# Patient Record
Sex: FEMALE | Race: WHITE | Employment: UNEMPLOYED | URBAN - METROPOLITAN AREA
[De-identification: names, ages, dates, MRNs, and addresses within clinical notes are randomized per-mention and may not be internally consistent; named-entity substitution may affect disease eponyms.]

---

## 2020-05-06 ENCOUNTER — APPOINTMENT (OUTPATIENT)
Dept: GENERAL RADIOLOGY | Age: 40
End: 2020-05-06
Attending: EMERGENCY MEDICINE
Payer: MEDICAID

## 2020-05-06 ENCOUNTER — HOSPITAL ENCOUNTER (EMERGENCY)
Age: 40
Discharge: HOME | End: 2020-05-06
Attending: EMERGENCY MEDICINE
Payer: MEDICAID

## 2020-05-06 ENCOUNTER — APPOINTMENT (OUTPATIENT)
Dept: CT IMAGING | Age: 40
End: 2020-05-06
Attending: EMERGENCY MEDICINE
Payer: MEDICAID

## 2020-05-06 VITALS
OXYGEN SATURATION: 100 % | RESPIRATION RATE: 14 BRPM | TEMPERATURE: 98 F | HEART RATE: 73 BPM | DIASTOLIC BLOOD PRESSURE: 82 MMHG | SYSTOLIC BLOOD PRESSURE: 130 MMHG

## 2020-05-06 DIAGNOSIS — S39.012A BACK STRAIN, INITIAL ENCOUNTER: ICD-10-CM

## 2020-05-06 DIAGNOSIS — S09.90XA INJURY OF HEAD, INITIAL ENCOUNTER: ICD-10-CM

## 2020-05-06 DIAGNOSIS — S01.01XA LACERATION OF SCALP, INITIAL ENCOUNTER: ICD-10-CM

## 2020-05-06 DIAGNOSIS — R55 SYNCOPE AND COLLAPSE: Primary | ICD-10-CM

## 2020-05-06 LAB
ATRIAL RATE: 73 BPM
CALCULATED P AXIS, ECG09: 18 DEGREES
CALCULATED R AXIS, ECG10: 0 DEGREES
CALCULATED T AXIS, ECG11: 12 DEGREES
DIAGNOSIS, 93000: NORMAL
HCG UR QL: NEGATIVE
P-R INTERVAL, ECG05: 112 MS
Q-T INTERVAL, ECG07: 418 MS
QRS DURATION, ECG06: 90 MS
QTC CALCULATION (BEZET), ECG08: 460 MS
VENTRICULAR RATE, ECG03: 73 BPM

## 2020-05-06 PROCEDURE — 75810000293 HC SIMP/SUPERF WND  RPR

## 2020-05-06 PROCEDURE — 99283 EMERGENCY DEPT VISIT LOW MDM: CPT

## 2020-05-06 PROCEDURE — 74011000250 HC RX REV CODE- 250: Performed by: EMERGENCY MEDICINE

## 2020-05-06 PROCEDURE — 74011250637 HC RX REV CODE- 250/637: Performed by: EMERGENCY MEDICINE

## 2020-05-06 PROCEDURE — 72072 X-RAY EXAM THORAC SPINE 3VWS: CPT

## 2020-05-06 PROCEDURE — 70450 CT HEAD/BRAIN W/O DYE: CPT

## 2020-05-06 PROCEDURE — 74011250636 HC RX REV CODE- 250/636: Performed by: EMERGENCY MEDICINE

## 2020-05-06 PROCEDURE — 93005 ELECTROCARDIOGRAM TRACING: CPT

## 2020-05-06 PROCEDURE — 81025 URINE PREGNANCY TEST: CPT

## 2020-05-06 PROCEDURE — 96372 THER/PROPH/DIAG INJ SC/IM: CPT

## 2020-05-06 RX ORDER — ACETAMINOPHEN 500 MG
1000 TABLET ORAL
Status: COMPLETED | OUTPATIENT
Start: 2020-05-06 | End: 2020-05-06

## 2020-05-06 RX ORDER — LIDOCAINE HYDROCHLORIDE AND EPINEPHRINE 10; 10 MG/ML; UG/ML
4.5 INJECTION, SOLUTION INFILTRATION; PERINEURAL ONCE
Status: COMPLETED | OUTPATIENT
Start: 2020-05-06 | End: 2020-05-06

## 2020-05-06 RX ORDER — KETOROLAC TROMETHAMINE 30 MG/ML
60 INJECTION, SOLUTION INTRAMUSCULAR; INTRAVENOUS ONCE
Status: COMPLETED | OUTPATIENT
Start: 2020-05-06 | End: 2020-05-06

## 2020-05-06 RX ORDER — CYCLOBENZAPRINE HCL 10 MG
10 TABLET ORAL
Qty: 10 TAB | Refills: 0 | Status: SHIPPED | OUTPATIENT
Start: 2020-05-06 | End: 2020-08-18

## 2020-05-06 RX ADMIN — LIDOCAINE HYDROCHLORIDE,EPINEPHRINE BITARTRATE 45 MG: 10; .01 INJECTION, SOLUTION INFILTRATION; PERINEURAL at 01:09

## 2020-05-06 RX ADMIN — KETOROLAC TROMETHAMINE 60 MG: 30 INJECTION, SOLUTION INTRAMUSCULAR at 02:01

## 2020-05-06 RX ADMIN — ACETAMINOPHEN 1000 MG: 500 TABLET ORAL at 01:09

## 2020-05-06 NOTE — DISCHARGE INSTRUCTIONS
Patient Education        Back Strain: Care Instructions  Overview    A back strain happens when you overstretch, or pull, a muscle in your back. You may hurt your back in an accident or when you exercise or lift something. Sometimes you may not know how you hurt your back. Most back pain will get better with rest and time. You can take care of yourself at home to help your back heal.  Follow-up care is a key part of your treatment and safety. Be sure to make and go to all appointments, and call your doctor if you are having problems. It's also a good idea to know your test results and keep a list of the medicines you take. How can you care for yourself at home? · Try to stay as active as you can, but stop or reduce any activity that causes pain. · Put ice or a cold pack on the sore muscle for 10 to 20 minutes at a time to stop swelling. Try this every 1 to 2 hours for 3 days (when you are awake) or until the swelling goes down. Put a thin cloth between the ice pack and your skin. · After 2 or 3 days, apply a heating pad on low or a warm cloth to your back. Some doctors suggest that you go back and forth between hot and cold treatments. · Take pain medicines exactly as directed. ? If the doctor gave you a prescription medicine for pain, take it as prescribed. ? If you are not taking a prescription pain medicine, ask your doctor if you can take an over-the-counter medicine. · Try sleeping on your side with a pillow between your legs. Or put a pillow under your knees when you lie on your back. These measures can ease pain in your lower back. · Return to your usual level of activity slowly. When should you call for help? Call 911 anytime you think you may need emergency care. For example, call if:    · You are unable to move a leg at all.   [facility] your doctor now or seek immediate medical care if:    · You have new or worse symptoms in your legs, belly, or buttocks.  Symptoms may include:  ? Numbness or tingling. ? Weakness. ? Pain.     · You lose bladder or bowel control.    Watch closely for changes in your health, and be sure to contact your doctor if:    · You have a fever, lose weight, or don't feel well.     · You are not getting better as expected. Where can you learn more? Go to http://tej-france.info/  Enter Z587 in the search box to learn more about \"Back Strain: Care Instructions. \"  Current as of: June 26, 2019Content Version: 12.4  © 8053-6355 [employer]. Care instructions adapted under license by [employer] (which disclaims liability or warranty for this information). If you have questions about a medical condition or this instruction, always ask your healthcare professional. [address] any warranty or liability for your use of this information. Patient Education        Learning About a Closed Head Injury  What is a closed head injury? A closed head injury happens when your head gets hit hard. The strong force of the blow causes your brain to shake in your skull. This movement can cause the brain to bruise, swell, or tear. Sometimes nerves or blood vessels also get damaged. This can cause bleeding in or around the brain. A concussion is a type of closed head injury. What are the symptoms? If you have a mild concussion, you may have a mild headache or feel \"not quite right. \" These symptoms are common. They usually go away over a few days to 4 weeks. But sometimes after a concussion, you feel like you can't function as well as before the injury. And you have new symptoms. This is called postconcussive syndrome. You may:  · Find it harder to solve problems, think, concentrate, or remember. · Have headaches. · Have changes in your sleep patterns, such as not being able to sleep or sleeping all the time. · Have changes in your personality. · Not be interested in your usual activities.   · Feel angry or anxious without a clear reason. · Lose your sense of taste or smell. · Be dizzy, lightheaded, or unsteady. It may be hard to stand or walk. How is a closed head injury treated? Any person who may have a concussion needs to see a doctor. Some people have to stay in the hospital to be watched. Others can go home safely. If you go home, follow your doctor's instructions. He or she will tell you if you need someone to watch you closely for the next 24 hours or longer. Rest is the best treatment. Get plenty of sleep at night. And try to rest during the day. · Avoid activities that are physically or mentally demanding. These include housework, exercise, and schoolwork. And don't play video games, send text messages, or use the computer. You may need to change your school or work schedule to be able to avoid these activities. · Ask your doctor when it's okay to drive, ride a bike, or operate machinery. · Take an over-the-counter pain medicine, such as acetaminophen (Tylenol), ibuprofen (Advil, Motrin), or naproxen (Aleve). Be safe with medicines. Read and follow all instructions on the label. · Check with your doctor before you use any other medicines for pain. · Do not drink alcohol or use illegal drugs. They can slow recovery. They can also increase your risk of getting a second head injury. Follow-up care is a key part of your treatment and safety. Be sure to make and go to all appointments, and call your doctor if you are having problems. It's also a good idea to know your test results and keep a list of the medicines you take. Where can you learn more? Go to http://tej-france.info/  Enter E235 in the search box to learn more about \"Learning About a Closed Head Injury. \"  Current as of: November 19, 2019Content Version: 12.4  © 8705-5272 [employer]. Care instructions adapted under license by [employer] (which disclaims liability or warranty for this information).  If you have questions about a medical condition or this instruction, always ask your healthcare professional. [address] any warranty or liability for your use of this information. Patient Education        Fainting: Care Instructions  Your Care Instructions    When you faint, or pass out, you lose consciousness for a short time. A brief drop in blood flow to the brain often causes it. When you fall or lie down, more blood flows to your brain and you regain consciousness. Emotional stress, pain, or overheating--especially if you have been standing--can make you faint. In these cases, fainting is usually not serious. But fainting can be a sign of a more serious problem. Your doctor may want you to have more tests to rule out other causes. The treatment you need depends on the reason why you fainted. The doctor has checked you carefully, but problems can develop later. If you notice any problems or new symptoms, get medical treatment right away. Follow-up care is a key part of your treatment and safety. Be sure to make and go to all appointments, and call your doctor if you are having problems. It's also a good idea to know your test results and keep a list of the medicines you take. How can you care for yourself at home? · Drink plenty of fluids to prevent dehydration. If you have kidney, heart, or liver disease and have to limit fluids, talk with your doctor before you increase your fluid intake. When should you call for help? Call 911 anytime you think you may need emergency care. For example, call if:    · You have symptoms of a heart problem. These may include:  ? Chest pain or pressure. ? Severe trouble breathing. ? A fast or irregular heartbeat. ? Lightheadedness or sudden weakness. ? Coughing up pink, foamy mucus. ? Passing out. After you call  911, the  may tell you to chew 1 adult-strength or 2 to 4 low-dose aspirin. Wait for an ambulance.  Do not try to drive yourself.     · You have symptoms of a stroke. These may include:  ? Sudden numbness, tingling, weakness, or loss of movement in your face, arm, or leg, especially on only one side of your body. ? Sudden vision changes. ? Sudden trouble speaking. ? Sudden confusion or trouble understanding simple statements. ? Sudden problems with walking or balance. ? A sudden, severe headache that is different from past headaches.     · You passed out (lost consciousness) again.    Watch closely for changes in your health, and be sure to contact your doctor if:    · You do not get better as expected. Where can you learn more? Go to http://tej-france.info/  Enter A848 in the search box to learn more about \"Fainting: Care Instructions. \"  Current as of: June 26, 2019Content Version: 12.4  © 1144-8483 [employer]. Care instructions adapted under license by [employer] (which disclaims liability or warranty for this information). If you have questions about a medical condition or this instruction, always ask your healthcare professional. [address] any warranty or liability for your use of this information. Patient Education        Cuts: Care Instructions  Your Care Instructions  A cut can happen anywhere on your body. Stitches, staples, skin adhesives, or pieces of tape called Steri-Strips are sometimes used to keep the edges of a cut together and help it heal. Steri-Strips can be used by themselves or with stitches or staples. Sometimes cuts are left open. If the cut went deep and through the skin, the doctor may have closed the cut in two layers. A deeper layer of stitches brings the deep part of the cut together. These stitches will dissolve and don't need to be removed. The upper layer closure, which could be stitches, staples, Steri-Strips, or adhesive, is what you see on the cut. A cut is often covered by a bandage.   The doctor has checked you carefully, but problems can develop later. If you notice any problems or new symptoms, get medical treatment right away. Follow-up care is a key part of your treatment and safety. Be sure to make and go to all appointments, and call your doctor if you are having problems. It's also a good idea to know your test results and keep a list of the medicines you take. How can you care for yourself at home? If a cut is open or closed  · Prop up the sore area on a pillow anytime you sit or lie down during the next 3 days. Try to keep it above the level of your heart. This will help reduce swelling. · Keep the cut dry for the first 24 to 48 hours. After this, you can shower if your doctor okays it. Pat the cut dry. · Don't soak the cut, such as in a bathtub. Your doctor will tell you when it's safe to get the cut wet. · After the first 24 to 48 hours, clean the cut with soap and water 2 times a day unless your doctor gives you different instructions. ? Don't use hydrogen peroxide or alcohol, which can slow healing. ? You may cover the cut with a thin layer of petroleum jelly and a nonstick bandage. ? If the doctor put a bandage over the cut, put on a new bandage after cleaning the cut or if the bandage gets wet or dirty. · Avoid any activity that could cause your cut to reopen. · Be safe with medicines. Read and follow all instructions on the label. ? If the doctor gave you a prescription medicine for pain, take it as prescribed. ? If you are not taking a prescription pain medicine, ask your doctor if you can take an over-the-counter medicine. If the cut is closed with stitches, staples, or Steri-Strips  · Follow the above instructions for open or closed cuts. · Do not remove the stitches or staples on your own. Your doctor will tell you when to come back to have the stitches or staples removed. · Leave Steri-Strips on until they fall off.   If the cut is closed with a skin adhesive  · Follow the above instructions for open or closed cuts. · Leave the skin adhesive on your skin until it falls off on its own. This may take 5 to 10 days. · Do not scratch, rub, or pick at the adhesive. · Do not put the sticky part of a bandage directly on the adhesive. · Do not put any kind of ointment, cream, or lotion over the area. This can make the adhesive fall off too soon. Do not use hydrogen peroxide or alcohol, which can slow healing. When should you call for help? Call your doctor now or seek immediate medical care if:    · You have new pain, or your pain gets worse.     · The skin near the cut is cold or pale or changes color.     · You have tingling, weakness, or numbness near the cut.     · The cut starts to bleed, and blood soaks through the bandage. Oozing small amounts of blood is normal.     · You have trouble moving the area near the cut.     · You have symptoms of infection, such as:  ? Increased pain, swelling, warmth, or redness around the cut.  ? Red streaks leading from the cut.  ? Pus draining from the cut.  ? A fever.    Watch closely for changes in your health, and be sure to contact your doctor if:    · The cut reopens.     · You do not get better as expected. Where can you learn more? Go to http://tej-france.info/  Enter M735 in the search box to learn more about \"Cuts: Care Instructions. \"  Current as of: June 26, 2019Content Version: 12.4  © 6686-0707 Healthwise, Incorporated. Care instructions adapted under license by [employer] (which disclaims liability or warranty for this information). If you have questions about a medical condition or this instruction, always ask your healthcare professional. [address] any warranty or liability for your use of this information.

## 2020-05-06 NOTE — ED PROVIDER NOTES
History of autoimmune disease, cancer. She presents after an apparent syncopal event. She states that she got up while watching a movie and felt dizzy. While she was going to get some water, she apparently fainted. She struck the back of her head and has a laceration. She complains of midthoracic pain in between her shoulder blades since the fall. It is moderate and worse with movement. She has a mild headache. She denies nausea, vomiting, or ongoing dizziness. She states that she did not eat as much throughout the day today. No fever, cough, congestion, chest pain, shortness of breath. She states that she thinks she was unconscious for about a minute before regaining it. Past Medical History:   Diagnosis Date    [facility]     Cancer ([facility]        History reviewed. No pertinent surgical history. History reviewed. No pertinent family history. Social History     Socioeconomic History    Marital status:      Spouse name: Not on file    Number of children: Not on file    Years of education: Not on file    Highest education level: Not on file   Occupational History    Not on file   Social Needs    Financial resource strain: Not on file    Food insecurity     Worry: Not on file     Inability: Not on file    Transportation needs     Medical: Not on file     Non-medical: Not on file   Tobacco Use    Smoking status: Never Smoker    Smokeless tobacco: Never Used   Substance and Sexual Activity    Alcohol use:  Yes     Alcohol/week: 0.8 standard drinks     Types: 1 Glasses of wine per week    Drug use: No    Sexual activity: Yes     Partners: Male     Birth control/protection: Condom, Pill   Lifestyle    Physical activity     Days per week: Not on file     Minutes per session: Not on file    Stress: Not on file   Relationships    Social connections     Talks on phone: Not on file     Gets together: Not on file     Attends Rastafari service: Not on file Active member of club or organization: Not on file     Attends meetings of clubs or organizations: Not on file     Relationship status: Not on file    Intimate partner violence     Fear of current or ex partner: Not on file     Emotionally abused: Not on file     Physically abused: Not on file     Forced sexual activity: Not on file   Other Topics Concern    Not on file   Social History Narrative    Not on file         ALLERGIES: Ceclor [cefaclor] and Tetanus vaccines and toxoid    Review of Systems   All other systems reviewed and are negative. There were no vitals filed for this visit. Physical Exam  Vitals signs and nursing note reviewed. Constitutional:       Appearance: She is well-developed. HENT:      Head: Normocephalic. Comments: 3 cm posterior scalp laceration. Bleeding controlled. Eyes:      Conjunctiva/sclera: Conjunctivae normal.   Neck:      Musculoskeletal: Neck supple. Trachea: No tracheal deviation. Cardiovascular:      Rate and Rhythm: Normal rate and regular rhythm. Heart sounds: Normal heart sounds. No murmur. No friction rub. No gallop. Pulmonary:      Effort: Pulmonary effort is normal.      Breath sounds: Normal breath sounds. Abdominal:      Palpations: Abdomen is soft. Tenderness: There is no abdominal tenderness. Musculoskeletal:         General: No deformity. Comments: Midthoracic tenderness in between her shoulder blades. Skin:     General: Skin is warm and dry. Neurological:      Mental Status: She is alert. Comments: oriented          MDM       Wound Repair  Date/Time: 5/6/2020 2:55 AM  Performed by: attendingPre-procedure re-eval: Immediately prior to the procedure, the patient was reevaluated and found suitable for the planned procedure and any planned medications.   Location details: scalp  Wound length:2.6 - 7.5 cm  Anesthesia: local infiltration    Anesthesia:  Local Anesthetic: lidocaine 1% with epinephrine  Anesthetic total: 5 mL  Foreign bodies: no foreign bodies  Skin closure: staples  Number of sutures: 5 staples. Approximation: close  Patient tolerance: Patient tolerated the procedure well with no immediate complications  My total time at bedside, performing this procedure was 1-15 minutes. Patient declines blood work. She is okay with head CT, T-spine films, and EKG. Beth Harada, MD  1:08 AM    EKG: Normal sinus rhythm; rate of 73; normal ST, T.  Normal intervals. Beth Harada, MD  1:12 AM      Progress Note:  Results, treatment, and follow up plan have been discussed with patient. Questions were answered. Beth Harada, MD  2:22 AM    Assessment/plan: Presents after syncopal event -suspect vasovagal syncope. She suffered a posterior scalp laceration during the fall. She also complains of midthoracic back pain. Head CT and thoracic spine films negative. Reassuring appearance/exam with stable vital signs. She declines blood work. EKG is normal.  Follow-up with PCP/Ortho as needed. Tylenol/ibuprofen, Flexeril for pain.   Beth Harada, MD  2:23 AM

## 2020-05-06 NOTE — ED TRIAGE NOTES
Triage note:  Pt arrived with c/o passing out and a GLF and hit posterior head. Pt also c/o pain to back s/p fall.

## 2020-05-07 ENCOUNTER — PATIENT OUTREACH (OUTPATIENT)
Dept: FAMILY MEDICINE CLINIC | Age: 40
End: 2020-05-07

## 2020-05-15 ENCOUNTER — HOSPITAL ENCOUNTER (EMERGENCY)
Age: 40
Discharge: HOME | End: 2020-05-15
Attending: EMERGENCY MEDICINE
Payer: MEDICAID

## 2020-05-15 VITALS
OXYGEN SATURATION: 99 % | BODY MASS INDEX: 20.55 KG/M2 | WEIGHT: 135.58 LBS | HEART RATE: 56 BPM | TEMPERATURE: 97.6 F | SYSTOLIC BLOOD PRESSURE: 119 MMHG | RESPIRATION RATE: 16 BRPM | DIASTOLIC BLOOD PRESSURE: 67 MMHG | HEIGHT: 68 IN

## 2020-05-15 DIAGNOSIS — Z48.02 ENCOUNTER FOR STAPLE REMOVAL: Primary | ICD-10-CM

## 2020-05-15 PROCEDURE — 75810000275 HC EMERGENCY DEPT VISIT NO LEVEL OF CARE

## 2020-05-15 NOTE — ED PROVIDER NOTES
68-year-old female presents for staple remover. They have been in 10 days. She fell 10 days ago after becoming dizzy. She had a normal work-up at that time but declined blood work. She denies any recurrent episodes of syncope or dizziness. She denies any fevers or drainage from the site. There are no other medical concerns at this time. The history is provided by the patient. Staple Removal    This is a new problem. Episode onset: 10 days. The problem occurs constantly. The pain is at a severity of 0/10. The patient is experiencing no pain. There has been a history of trauma. Past Medical History:   Diagnosis Date    [facility] ([facility]     [facility]        History reviewed. No pertinent surgical history. History reviewed. No pertinent family history. Social History     Socioeconomic History    Marital status: LEGALLY      Spouse name: Not on file    Number of children: Not on file    Years of education: Not on file    Highest education level: Not on file   Occupational History    Not on file   Social Needs    Financial resource strain: Not on file    Food insecurity     Worry: Not on file     Inability: Not on file    Transportation needs     Medical: Not on file     Non-medical: Not on file   Tobacco Use    Smoking status: Never Smoker    Smokeless tobacco: Never Used   Substance and Sexual Activity    Alcohol use:  Yes     Alcohol/week: 0.8 standard drinks     Types: 1 Glasses of wine per week    Drug use: No    Sexual activity: Yes     Partners: Male     Birth control/protection: Condom, Pill   Lifestyle    Physical activity     Days per week: Not on file     Minutes per session: Not on file    Stress: Not on file   Relationships    Social connections     Talks on phone: Not on file     Gets together: Not on file     Attends Restoration service: Not on file     Active member of club or organization: Not on file     Attends meetings of clubs or organizations: Not on file     Relationship status: Not on file    Intimate partner violence     Fear of current or ex partner: Not on file     Emotionally abused: Not on file     Physically abused: Not on file     Forced sexual activity: Not on file   Other Topics Concern    Not on file   Social History Narrative    Not on file         ALLERGIES: Ceclor [cefaclor] and Tetanus vaccines and toxoid    Review of Systems   All other systems reviewed and are negative. Vitals:    05/15/20 0829   BP: 119/67   Pulse: (!) 56   Resp: 16   Temp: 97.6 °F (36.4 °C)   SpO2: 99%   Weight: 61.5 kg (135 lb 9.3 oz)   Height: 5' 8\" (1.727 m)            Physical Exam  Constitutional:       Appearance: She is well-developed. HENT:      Head: Normocephalic and atraumatic. Eyes:      General: No scleral icterus. Neck:      Musculoskeletal: No neck rigidity. Trachea: No tracheal deviation. Cardiovascular:      Rate and Rhythm: Normal rate. Pulmonary:      Effort: Pulmonary effort is normal. No respiratory distress. Abdominal:      General: There is no distension. Genitourinary:     Comments: deferred  Musculoskeletal:         General: No deformity. Skin:     General: Skin is dry. Comments: 5 staples in posterior scalp with no erythema or active purulent drainage. Neurological:      General: No focal deficit present. Mental Status: She is alert. Psychiatric:         Mood and Affect: Mood normal.          MDM  Number of Diagnoses or Management Options  Diagnosis management comments: Staples removed by nursing staff. Patient stable for discharge.          Procedures

## 2020-05-15 NOTE — ED TRIAGE NOTES
Pt returns to ED for staple removal from well healed scalp laceration. Staples have been in for ten days.

## 2020-08-18 ENCOUNTER — HOSPITAL ENCOUNTER (EMERGENCY)
Age: 40
Discharge: HOME | End: 2020-08-18
Attending: EMERGENCY MEDICINE
Payer: MEDICAID

## 2020-08-18 VITALS
WEIGHT: 130.51 LBS | BODY MASS INDEX: 20.48 KG/M2 | RESPIRATION RATE: 16 BRPM | SYSTOLIC BLOOD PRESSURE: 137 MMHG | TEMPERATURE: 98.4 F | HEIGHT: 67 IN | HEART RATE: 58 BPM | OXYGEN SATURATION: 100 % | DIASTOLIC BLOOD PRESSURE: 94 MMHG

## 2020-08-18 DIAGNOSIS — S61.216A LACERATION OF RIGHT LITTLE FINGER WITHOUT FOREIGN BODY WITHOUT DAMAGE TO NAIL, INITIAL ENCOUNTER: Primary | ICD-10-CM

## 2020-08-18 PROCEDURE — 75810000283 HC INJECTION NERVE BLOCK

## 2020-08-18 PROCEDURE — 74011000250 HC RX REV CODE- 250: Performed by: EMERGENCY MEDICINE

## 2020-08-18 PROCEDURE — 99282 EMERGENCY DEPT VISIT SF MDM: CPT

## 2020-08-18 RX ORDER — LIDOCAINE HYDROCHLORIDE 10 MG/ML
10 INJECTION, SOLUTION EPIDURAL; INFILTRATION; INTRACAUDAL; PERINEURAL
Status: COMPLETED | OUTPATIENT
Start: 2020-08-18 | End: 2020-08-18

## 2020-08-18 RX ORDER — CEPHALEXIN 500 MG/1
500 CAPSULE ORAL 2 TIMES DAILY
Qty: 14 CAP | Refills: 0 | Status: SHIPPED | OUTPATIENT
Start: 2020-08-18 | End: 2020-08-25

## 2020-08-18 RX ADMIN — LIDOCAINE HYDROCHLORIDE 10 ML: 10 INJECTION, SOLUTION EPIDURAL; INFILTRATION; INTRACAUDAL; PERINEURAL at 09:34

## 2020-08-18 NOTE — ED NOTES
Pt was discharged and given instructions by Dr Konstantin Jones . Pt verbalized good understanding of all discharge instructions,e scribed prescription and F/U care. All questions answered. Pt in stable condition on discharge.
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
## 2021-02-17 ENCOUNTER — VIRTUAL VISIT (OUTPATIENT)
Dept: FAMILY MEDICINE CLINIC | Age: 41
End: 2021-02-17
Payer: MEDICAID

## 2021-02-17 DIAGNOSIS — G47.00 INSOMNIA, UNSPECIFIED TYPE: ICD-10-CM

## 2021-02-17 DIAGNOSIS — G47.00 INSOMNIA, UNSPECIFIED TYPE: Primary | ICD-10-CM

## 2021-02-17 DIAGNOSIS — R63.5 UNINTENDED WEIGHT GAIN: ICD-10-CM

## 2021-02-17 DIAGNOSIS — Z78.9 STRICT VEGETARIAN DIET: ICD-10-CM

## 2021-02-17 DIAGNOSIS — G47.8 NON-RESTORATIVE SLEEP: ICD-10-CM

## 2021-02-17 DIAGNOSIS — Z76.89 ESTABLISHING CARE WITH NEW DOCTOR, ENCOUNTER FOR: ICD-10-CM

## 2021-02-17 DIAGNOSIS — R53.83 FATIGUE, UNSPECIFIED TYPE: ICD-10-CM

## 2021-02-17 PROCEDURE — 99204 OFFICE O/P NEW MOD 45 MIN: CPT | Performed by: FAMILY MEDICINE

## 2021-02-17 RX ORDER — TRAZODONE HYDROCHLORIDE 50 MG/1
50 TABLET ORAL
Qty: 30 TAB | Refills: 1 | Status: SHIPPED | OUTPATIENT
Start: 2021-02-17 | End: 2021-03-16 | Stop reason: SDUPTHER

## 2021-02-17 RX ORDER — DROSPIRENONE AND ETHINYL ESTRADIOL 0.02-3(28)
KIT ORAL
COMMUNITY
End: 2022-01-20

## 2021-02-17 NOTE — PROGRESS NOTES
John Jimenez is a 47 y.o. female who was seen by synchronous (real-time) audio-video technology on 2/17/2021. Consent: John Jimenez, who was seen by synchronous (real-time) audio-video technology, and/or her healthcare decision maker, is aware that this patient-initiated, Telehealth encounter on 2/17/2021 is a billable service, with coverage as determined by her insurance carrier. She is aware that she may receive a bill and has provided verbal consent to proceed: Yes. Assessment & Plan:   1. Insomnia, unspecified type  Will look for metabolic/endocrine underlying cause  Sleep hygiene is recommended  Pt has trialed home remedies including benadryl, hygiene changes and melatonin  Recommend trial trazodone, avoid addictive medications as first line and pt understanding agreeable  - CBC W/O DIFF; Future  - METABOLIC PANEL, COMPREHENSIVE; Future  - LIPID PANEL; Future  - TSH 3RD GENERATION; Future  - VITAMIN B12 & FOLATE; Future  - FERRITIN; Future  - traZODone (DESYREL) 50 mg tablet; Take 1 Tab by mouth nightly. Dispense: 30 Tab; Refill: 1    2. Fatigue, unspecified type  Labs per orders, best fasting if possible  - CBC W/O DIFF; Future  - METABOLIC PANEL, COMPREHENSIVE; Future  - LIPID PANEL; Future  - TSH 3RD GENERATION; Future  - VITAMIN B12 & FOLATE; Future    3. Unintended weight gain  As per orders check labs, c/w healthy habits at home  - CBC W/O DIFF; Future  - METABOLIC PANEL, COMPREHENSIVE; Future  - LIPID PANEL; Future  - TSH 3RD GENERATION; Future  - VITAMIN B12 & FOLATE; Future    4. Strict vegetarian diet  Labs per orders, c/w healthy diet  - CBC W/O DIFF; Future  - METABOLIC PANEL, COMPREHENSIVE; Future  - LIPID PANEL; Future  - TSH 3RD GENERATION; Future  - VITAMIN B12 & FOLATE; Future  - FERRITIN; Future    5. Establishing care with new doctor, encounter for  Discussed HM with patient    6. Non-restorative sleep  As above  - CBC W/O DIFF; Future  - METABOLIC PANEL, COMPREHENSIVE;  Future  - LIPID PANEL; Future  - TSH 3RD GENERATION; Future  - VITAMIN B12 & FOLATE; Future  - FERRITIN; Future  - traZODone (DESYREL) 50 mg tablet; Take 1 Tab by mouth nightly. Dispense: 30 Tab; Refill: 1          Pt was counseled on risks, benefits and alternatives of treatment options. All questions were asked and answered and the patient was agreeable with the treatment plan as outlined. Encounter time today was 40 minutes and more than 50% of this encounter was spent in counseling face-to-face regarding Diagnosis, Patient Education, Medication Management, Compliance and Impressions. Time documented includes face to face time, documentation and chart review if applicable except as noted.   Subjective:   Jena Jain is a 47 y.o. female who was seen for [facility] [address] 85: house with 3 children  Work: real estate  Last PCP: a while ago, hasn't bee consistent with PCP, doesn't remember the last time she was seen with gynecology--has an upcoming appointment, [facility]  Dentist: goes regularly  Eye Doctor: not regularly, had Lasik 5 years ago, has a little bit of dry eyes from this    Exercise: yes running, 5-6 times a week 3-4 miles  Diet: eating healthy, vegetarian    Sluggish, fatigued, having trouble sleeping--she falls asleep fine but she can't seem to sleep past 330 or 4 in the morning  She is taking benadryl, she wakes up at 430 or 5 in the morning with this    Weight change: she is exercising and eating well but she is gaining weight (5-8 lbs unintended) and thyroid runs in her family    Gums are receding  Grinding teeth    Not feeling particularly anxious or depression  3 most recent PHQ Screens 2/17/2021   Little interest or pleasure in doing things Not at all   Feeling down, depressed, irritable, or hopeless Not at all   Total Score PHQ 2 0     Today no n/v/d/c/f/ch/cp or sob    At night she reports she is quite gassy, she has a lot of flatulence worsening in the last 5-6 years  Stool is soft and regular  Not taking any supplements  She eats oatmeal in the morning  No diarrhea no rectal bleeding, stools are on the loose end, she eats a lot of fruits and veggies    Etoh: a few drinks a week  Tob: no  Illicit: no    Sa: one male partner    fhx brca in great grandmother  Grandmother had pancreatic cancer    Medications, allergies, PMH, PSH, SOCH, [address] reviewed and updated per routine protocol, see chart for review and changes if not noted here. ROS  A 12 point review of systems was negative except as noted here or in the HPI.     Objective:   Vital Signs: (As obtained by patient/caregiver at home)  Patient-Reported Vitals 2/17/2021   Patient-Reported Weight 138lbs   Patient-Reported Pulse 58   Patient-Reported Temperature 98.4   Patient-Reported Systolic  704   Patient-Reported Diastolic 81        [INSTRUCTIONS:  \"[x]\" Indicates a positive item  \"[]\" Indicates a negative item  -- DELETE ALL ITEMS NOT EXAMINED]    Constitutional: [x] Appears well-developed and well-nourished [x] No apparent distress      [] Abnormal -     Mental status: [x] Alert and awake  [x] Oriented to person/place/time [x] Able to follow commands    [] Abnormal -     Eyes:   EOM    [x]  Normal    [] Abnormal -   Sclera  [x]  Normal    [] Abnormal -          Discharge [x]  None visible   [] Abnormal -     HENT: [x] Normocephalic, atraumatic  [] Abnormal -   [x] Mouth/Throat: Mucous membranes are moist    External Ears [x] Normal  [] Abnormal -    Neck: [x] No visualized mass [] Abnormal -     Pulmonary/Chest: [x] Respiratory effort normal   [x] No visualized signs of difficulty breathing or respiratory distress        [] Abnormal -      Musculoskeletal:   [] Normal gait with no signs of ataxia         [x] Normal range of motion of neck        [] Abnormal -     Neurological:        [x] No Facial Asymmetry (Cranial nerve 7 motor function) (limited exam due to video visit)          [x] No gaze palsy        [] Abnormal -          Skin:        [x] No significant exanthematous lesions or discoloration noted on facial skin         [] Abnormal -            Psychiatric:       [x] Normal Affect [] Abnormal -        [x] No Hallucinations    Other pertinent observable physical exam findings:seated, well appearing, no distress, non toxic    We discussed the expected course, resolution and complications of the diagnosis(es) in detail. Medication risks, benefits, costs, interactions, and alternatives were discussed as indicated. I advised her to contact the office if her condition worsens, changes or fails to improve as anticipated. She expressed understanding with the diagnosis(es) and plan. Berenice Gorman is a 47 y.o. female who was evaluated by a video visit encounter for concerns as above. Patient identification was verified prior to start of the visit. A caregiver was present when appropriate. Due to this being a TeleHealth encounter ([facility]), evaluation of the following organ systems was limited: Vitals/Constitutional/EENT/Resp/CV/GI//MS/Neuro/Skin/Heme-Lymph-Imm. Pursuant to the emergency declaration under the [facility], [facility] waiver authority and the [employer] Act, this Virtual  Visit was conducted, with patient's (and/or legal guardian's) consent, to reduce the patient's risk of exposure to COVID-19 and provide necessary medical care. Services were provided through a video synchronous discussion virtually to substitute for in-person clinic visit. Patient and provider were located at their individual homes. Sergio Melvin MD  [facility]  02/17/21 9:39 AM     Portions of this note may have been populated using smart dictation software and may have \"sounds-like\" errors present.

## 2021-02-18 LAB
ALBUMIN SERPL-MCNC: 4 G/DL (ref 3.8–4.8)
ALBUMIN/GLOB SERPL: 1.5 {RATIO} (ref 1.2–2.2)
ALP SERPL-CCNC: 73 IU/L (ref 39–117)
ALT SERPL-CCNC: 13 IU/L (ref 0–32)
AST SERPL-CCNC: 20 IU/L (ref 0–40)
BILIRUB SERPL-MCNC: 0.6 MG/DL (ref 0–1.2)
BUN SERPL-MCNC: 7 MG/DL (ref 6–20)
BUN/CREAT SERPL: 10 (ref 9–23)
CALCIUM SERPL-MCNC: 9.4 MG/DL (ref 8.7–10.2)
CHLORIDE SERPL-SCNC: 101 MMOL/L (ref 96–106)
CHOLEST SERPL-MCNC: 226 MG/DL (ref 100–199)
CO2 SERPL-SCNC: 26 MMOL/L (ref 20–29)
CREAT SERPL-MCNC: 0.73 MG/DL (ref 0.57–1)
ERYTHROCYTE [DISTWIDTH] IN BLOOD BY AUTOMATED COUNT: 12.2 % (ref 11.7–15.4)
FERRITIN SERPL-MCNC: 11 NG/ML (ref 15–150)
FOLATE SERPL-MCNC: 13.7 NG/ML
GLOBULIN SER CALC-MCNC: 2.7 G/DL (ref 1.5–4.5)
GLUCOSE SERPL-MCNC: 82 MG/DL (ref 65–99)
HCT VFR BLD AUTO: 34.9 % (ref 34–46.6)
HDLC SERPL-MCNC: 109 MG/DL
HGB BLD-MCNC: 11.8 G/DL (ref 11.1–15.9)
INTERPRETATION, 910389: NORMAL
LDLC SERPL CALC-MCNC: 107 MG/DL (ref 0–99)
MCH RBC QN AUTO: 31.2 PG (ref 26.6–33)
MCHC RBC AUTO-ENTMCNC: 33.8 G/DL (ref 31.5–35.7)
MCV RBC AUTO: 92 FL (ref 79–97)
PLATELET # BLD AUTO: 323 X10E3/UL (ref 150–450)
POTASSIUM SERPL-SCNC: 4.4 MMOL/L (ref 3.5–5.2)
PROT SERPL-MCNC: 6.7 G/DL (ref 6–8.5)
RBC # BLD AUTO: 3.78 X10E6/UL (ref 3.77–5.28)
SODIUM SERPL-SCNC: 138 MMOL/L (ref 134–144)
TRIGL SERPL-MCNC: 57 MG/DL (ref 0–149)
TSH SERPL DL<=0.005 MIU/L-ACNC: 0.87 UIU/ML (ref 0.45–4.5)
VIT B12 SERPL-MCNC: 259 PG/ML (ref 232–1245)
VLDLC SERPL CALC-MCNC: 10 MG/DL (ref 5–40)
WBC # BLD AUTO: 6 X10E3/UL (ref 3.4–10.8)

## 2021-02-18 NOTE — PROGRESS NOTES
Normal electrolytes, liver and kidney function  Normal blood count (no anemia)  Normal b12 and folate   Normal thyroid value  Borderline cholesterol, high healthy cholesterol, borderline ldl (lousy) cholesterol, very low triglycerides--we watch this but don't intervene (this is likely an inherited pattern)  Iron stores are low (Ferritin)--recommend you add an iron containing vitamin daily

## 2021-03-16 ENCOUNTER — OFFICE VISIT (OUTPATIENT)
Dept: FAMILY MEDICINE CLINIC | Age: 41
End: 2021-03-16
Payer: MEDICAID

## 2021-03-16 VITALS
HEART RATE: 49 BPM | SYSTOLIC BLOOD PRESSURE: 119 MMHG | BODY MASS INDEX: 20.72 KG/M2 | HEIGHT: 67 IN | TEMPERATURE: 97 F | DIASTOLIC BLOOD PRESSURE: 75 MMHG | WEIGHT: 132 LBS

## 2021-03-16 DIAGNOSIS — G47.8 NON-RESTORATIVE SLEEP: ICD-10-CM

## 2021-03-16 DIAGNOSIS — Z00.00 WELL WOMAN EXAM (NO GYNECOLOGICAL EXAM): Primary | ICD-10-CM

## 2021-03-16 DIAGNOSIS — R79.0 LOW FERRITIN: ICD-10-CM

## 2021-03-16 DIAGNOSIS — R79.89 HIGH SERUM HIGH DENSITY LIPOPROTEIN (HDL): ICD-10-CM

## 2021-03-16 DIAGNOSIS — G47.00 INSOMNIA, UNSPECIFIED TYPE: ICD-10-CM

## 2021-03-16 DIAGNOSIS — E78.5 HYPERLIPIDEMIA LDL GOAL <100: ICD-10-CM

## 2021-03-16 PROCEDURE — 99214 OFFICE O/P EST MOD 30 MIN: CPT | Performed by: FAMILY MEDICINE

## 2021-03-16 RX ORDER — TRAZODONE HYDROCHLORIDE 100 MG/1
100 TABLET ORAL
Qty: 30 TAB | Refills: 0 | Status: SHIPPED | OUTPATIENT
Start: 2021-03-16 | End: 2021-04-12

## 2021-03-16 NOTE — PROGRESS NOTES
Subjective:   47 y.o. female for Well Woman Check. Her gyne and breast care is done elsewhere by her Ob-Gyne physician. Past Medical History:   Diagnosis Date    [facility] ([facility]     Cancer ([facility]      History reviewed. No pertinent surgical history. History reviewed. No pertinent family history. Social History     Tobacco Use    Smoking status: Never Smoker    Smokeless tobacco: Never Used   Substance Use Topics    Alcohol use: Yes     Alcohol/week: 0.8 standard drinks     Types: 1 Glasses of wine per week        Lab Results   Component Value Date/Time    WBC 6.0 02/17/2021 11:14 AM    HGB 11.8 02/17/2021 11:14 AM    HCT 34.9 02/17/2021 11:14 AM    PLATELET 419 74/28/6114 11:14 AM    MCV 92 02/17/2021 11:14 AM     Lab Results   Component Value Date/Time    Cholesterol, total 226 (H) 02/17/2021 11:14 AM    HDL Cholesterol 109 02/17/2021 11:14 AM    LDL, calculated 107 (H) 02/17/2021 11:14 AM    Triglyceride 57 02/17/2021 11:14 AM     Lab Results   Component Value Date/Time    Sodium 138 02/17/2021 11:14 AM    Potassium 4.4 02/17/2021 11:14 AM    Chloride 101 02/17/2021 11:14 AM    CO2 26 02/17/2021 11:14 AM    Glucose 82 02/17/2021 11:14 AM    BUN 7 02/17/2021 11:14 AM    Creatinine 0.73 02/17/2021 11:14 AM    BUN/Creatinine ratio 10 02/17/2021 11:14 AM    GFR est  02/17/2021 11:14 AM    GFR est non- 02/17/2021 11:14 AM    Calcium 9.4 02/17/2021 11:14 AM    Bilirubin, total 0.6 02/17/2021 11:14 AM    ALT (SGPT) 13 02/17/2021 11:14 AM    Alk. phosphatase 73 02/17/2021 11:14 AM    Protein, total 6.7 02/17/2021 11:14 AM    Albumin 4.0 02/17/2021 11:14 AM    A-G Ratio 1.5 02/17/2021 11:14 AM      Lab Results   Component Value Date/Time    Ferritin 11 (L) 02/17/2021 11:14 AM     Lab Results   Component Value Date/Time    TSH 0.866 02/17/2021 11:14 AM          ROS: Feeling generally well. No TIA's or unusual headaches, no dysphagia. No prolonged cough.  No dyspnea or chest pain on exertion. No abdominal pain, change in bowel habits, black or bloody stools. No urinary tract symptoms. No new or unusual musculoskeletal symptoms. Labs all reviewed today  Specific concerns today: insomnia, vericose veins. Insomnia: tried trazodone 50, woke up earlier than before and so discontinued its use and did not pursue taking it further    She has noticed that she has some more prominent veins on her anterior tibia, particularly on the left side, she is wondering if they are going to become varicosities and she wants to know what should be done about them both preventatively and from a treatment standpoint. Objective: The patient appears well, alert, oriented x 3, in no distress. Visit Vitals  /75   Pulse (!) 49   Temp 97 °F (36.1 °C) (Temporal)   Ht 5' 7\" (1.702 m)   Wt 132 lb (59.9 kg)   BMI 20.67 kg/m²     ENT normal.  Neck supple. No adenopathy or thyromegaly. DAIN. Lungs are clear, good air entry, no wheezes, rhonchi or rales. S1 and S2 normal, no murmurs, regular rate and rhythm. Abdomen soft without tenderness, guarding, mass or organomegaly. Extremities show no edema, normal peripheral pulses. Neurological is normal, no focal findings. Breast and Pelvic exams are deferred. Assessment/Plan:   Well Woman    ICD-10-CM ICD-9-CM    1. Well woman exam (no gynecological exam)  Z00.00 V70.0     [V70.0]   2. Insomnia, unspecified type  G47.00 780.52 traZODone (DESYREL) 100 mg tablet   3. Non-restorative sleep  G47.8 780.59 traZODone (DESYREL) 100 mg tablet   4. Low ferritin  R79.0 790.6    5. High serum high density lipoprotein (HDL)  R79.89 790.99    6. Hyperlipidemia LDL goal <100  E78.5 272.4        Eat a healthy diet, exercise regularly, discussed insomnia treatment, patient desires to increase trazodone to 100 mg prior to discontinuing. As needed use of Benadryl and doxylamine as well.   Would recommend also sleep hygiene as the mainstay of treatment and management of modifiable factors such as bedroom temperature, sleep environment, light in room and similar. With regards to varicose veins I do not note any today although I do notice that she has a prominent pretibial vein that is not tortuous or pooling. I would recommend compression leggings or compression high socks to be worn when ambulating, standing for long periods of time or exercising. The patient was informed on this, I gave her the names of Dr. Silvio Farley and Dr. Weston First as reputable vascular surgeons in the area however I mention to her that based on the symptoms she experiences presently I think that any concerns now would be cosmetic and not medical.  She was understanding of this  With regards to her low ferritin I recommend an iron-containing vitamin, I did discuss also the iron fish that she can use to cut, she is following a vegetarian diet and it is absolutely okay to be vegetarian and still find sources for iron as we discussed today.   The patient will continue to work on this  Labs otherwise reviewed, very mild likely inherited dyslipidemia with high HDL and borderline LDL, she will continue to eat a high-fiber diet and exercise  She will follow-up annually or on an as-needed basis, she will call with concerns

## 2021-04-12 DIAGNOSIS — G47.8 NON-RESTORATIVE SLEEP: ICD-10-CM

## 2021-04-12 DIAGNOSIS — G47.00 INSOMNIA, UNSPECIFIED TYPE: ICD-10-CM

## 2021-04-12 RX ORDER — TRAZODONE HYDROCHLORIDE 100 MG/1
TABLET ORAL
Qty: 30 TAB | Refills: 0 | Status: SHIPPED | OUTPATIENT
Start: 2021-04-12 | End: 2022-01-20

## 2022-01-20 ENCOUNTER — OFFICE VISIT (OUTPATIENT)
Dept: ORTHOPEDIC SURGERY | Age: 42
End: 2022-01-20
Payer: MEDICAID

## 2022-01-20 VITALS — HEIGHT: 66 IN | BODY MASS INDEX: 20.89 KG/M2 | WEIGHT: 130 LBS

## 2022-01-20 DIAGNOSIS — M79.672 LEFT FOOT PAIN: ICD-10-CM

## 2022-01-20 DIAGNOSIS — M25.572 SINUS TARSI SYNDROME OF LEFT FOOT: Primary | ICD-10-CM

## 2022-01-20 PROCEDURE — 99213 OFFICE O/P EST LOW 20 MIN: CPT | Performed by: ORTHOPAEDIC SURGERY

## 2022-01-20 NOTE — PROGRESS NOTES
Renard Mojica (: 1980) is a 46 y.o. female, patient,here for evaluation of the following   Chief Complaint   Patient presents with    Foot Pain     having left foot pain like she was in the past         ASSESSMENT/PLAN:  Below is the assessment and plan developed based on review of pertinent history, physical exam, labs, studies, and medications. 1. Sinus tarsi syndrome of left foot  -     XR FOOT LT MIN 3 V; Future  -     MRI FOOT LT WO CONT; Future  2. Left foot pain      Patient has pain into the sinus tarsi left foot also similar problem right foot but less significant, she has had persistent problems with his left foot in the past suspect the possibility of stress fracture. She is very tight at the Achilles tendon so that may contribute to the overload of the sinus tarsi when doing high impact activities. Recommend holding off on high impact activities for now, and will obtain an MRI to further evaluate this left foot pain at the sinus tarsi area which also extends to the lateral anterior calcaneus. Patient would like to be called with the results and will return as needed. Return for Will call with MRI results. .      Allergies   Allergen Reactions    Ceclor [Cefaclor] Hives    Tetanus Vaccines And Toxoid Hives       No current outpatient medications on file. No current facility-administered medications for this visit. Past Medical History:   Diagnosis Date    Autoimmune disease ([facility] [address]     Cancer ([facility] [address]        History reviewed. No pertinent surgical history. History reviewed. No pertinent family history.     Social History     Socioeconomic History    Marital status:      Spouse name: Not on file    Number of children: Not on file    Years of education: Not on file    Highest education level: Not on file   Occupational History    Not on file   Tobacco Use    Smoking status: Never Smoker    Smokeless tobacco: Never Used   Vaping Use    Vaping Use: Never used Substance and Sexual Activity    Alcohol use: Yes     Alcohol/week: 0.8 standard drinks     Types: 1 Glasses of wine per week    Drug use: No    Sexual activity: Yes     Partners: Male     Birth control/protection: Condom, Pill   Other Topics Concern    Not on file   Social History Narrative    Not on file     Social Determinants of Health     Financial Resource Strain:     Difficulty of Paying Living Expenses: Not on file   Food Insecurity:     Worried About Running Out of Food in the Last Year: Not on file    Elvira of Food in the Last Year: Not on file   Transportation Needs:     Lack of Transportation (Medical): Not on file    Lack of Transportation (Non-Medical): Not on file   Physical Activity:     Days of Exercise per Week: Not on file    Minutes of Exercise per Session: Not on file   Stress:     Feeling of Stress : Not on file   Social Connections:     Frequency of Communication with Friends and Family: Not on file    Frequency of Social Gatherings with Friends and Family: Not on file    Attends [employer] Services: Not on file    Active Member of [address] [place] [address] or Organizations: Not on file    Attends Club or Organization Meetings: Not on file    Marital Status: Not on file   Intimate Partner Violence:     Fear of Current or Ex-Partner: Not on file    Emotionally Abused: Not on file    Physically Abused: Not on file    Sexually Abused: Not on file   Housing Stability:     Unable to Pay for Housing in the Last Year: Not on file    Number of [place] in the Last Year: Not on file    Unstable Housing in the Last Year: Not on file           Vitals:  Ht 5' 6\" (1.676 m)   Wt 130 lb (59 kg)   BMI 20.98 kg/m²    Body mass index is 20.98 kg/m².     ROS     Positive for: Musculoskeletal (left foot )    Negative for: Constitutional, Gastrointestinal, Neurological, Skin, Genitourinary, HENT, Endocrine, Cardiovascular, Eyes, Respiratory, Psychiatric, Allergic/Imm, Heme/Lymph    Last edited by Mariposa Sneed on 2022  8:18 AM. (History)              SUBJECTIVE/OBJECTIVE:  Roscoe Resendez (: 1980)   Former patient last seen May 2021 returns today with complaint of left foot pain, in the past also seen for left foot pain, suspected possibility of stress fracture. Over time she got better but recently also doing more activities such as running and high-impact exercise programs and for the past 6 months she has been experiencing more pain again. In addition the previous pain she had she feels did not fully recover. Current pain is mild dull pain comes and goes worse with running activities. Overall had improvements in the past, she has tried a boot and brace. She is not diabetic, non-smoker. Physical Exam  Pleasant well-nourished female , alert and oriented to person, time and place, no acute distress. Mild antalgic gait, satisfactory weightbearing stance. Left ankle: There is a nontender exam of the left ankle, the Achilles tendon intact with a negative Verduzco test, negative ankle squeeze test.  There is a positive Silfverskiold test.    Left foot: There is no swelling, no ecchymosis, no fluctuance, no erythema. Tenderness to palpation lateral anterior calcaneus, sinus tarsi, there is no tenderness to the forefoot metatarsals, able to flex and extend all toes satisfactory motion and strength 5/5. Contralateral foot and ankle exam tenderness at the sinus tarsi similar to the contralateral foot but to a lesser extent, no swelling ligaments grossly stable. Normal weightbearing stance. Neurovascular exam intact for light touch sensation, cap refill, dorsalis pedis pulse palpable, flexion/extension strength 5/5. Skin intact without open wounds, lesions or ulcers, no skin discolorations, normal warmth to skin.       Imaging:    XR Results (most recent):  Results from Appointment encounter on 22    XR FOOT LT MIN 3 V    Narrative  Left foot, lateral and oblique nonweightbearing x-rays show no obvious fractures or dislocations. That includes area of pain at the sinus tarsi and calcaneocuboid joint area, satisfactory bone density, no lesions, no malalignment or deformities. An electronic signature was used to authenticate this note.   -- Yana Keys MD

## 2022-02-07 ENCOUNTER — HOSPITAL ENCOUNTER (OUTPATIENT)
Dept: MRI IMAGING | Age: 42
Discharge: HOME | End: 2022-02-07
Attending: ORTHOPAEDIC SURGERY
Payer: MEDICAID

## 2022-02-07 DIAGNOSIS — M25.572 SINUS TARSI SYNDROME OF LEFT FOOT: ICD-10-CM

## 2022-02-07 PROCEDURE — 73718 MRI LOWER EXTREMITY W/O DYE: CPT

## 2022-06-24 ENCOUNTER — OFFICE VISIT (OUTPATIENT)
Dept: ORTHOPEDIC SURGERY | Age: 42
End: 2022-06-24
Payer: MEDICAID

## 2022-06-24 DIAGNOSIS — G89.29 HIP PAIN, CHRONIC, LEFT: Primary | ICD-10-CM

## 2022-06-24 DIAGNOSIS — Q65.9 HIP DEFORMITY, CONGENITAL: ICD-10-CM

## 2022-06-24 DIAGNOSIS — S76.012A STRAIN OF LEFT HIP, INITIAL ENCOUNTER: ICD-10-CM

## 2022-06-24 DIAGNOSIS — M41.26 OTHER IDIOPATHIC SCOLIOSIS, LUMBAR REGION: ICD-10-CM

## 2022-06-24 DIAGNOSIS — M43.16 SPONDYLOLISTHESIS OF LUMBAR REGION: ICD-10-CM

## 2022-06-24 DIAGNOSIS — M51.36 BULGE OF LUMBAR DISC WITHOUT MYELOPATHY: ICD-10-CM

## 2022-06-24 DIAGNOSIS — M25.552 HIP PAIN, CHRONIC, LEFT: Primary | ICD-10-CM

## 2022-06-24 PROCEDURE — 99214 OFFICE O/P EST MOD 30 MIN: CPT | Performed by: PHYSICIAN ASSISTANT

## 2022-06-24 NOTE — PROGRESS NOTES
Adair Menon (: 1980) is a 46 y.o. female, new  patient, here for evaluation of the following chief complaint(s):  No chief complaint on file. SUBJECTIVE/OBJECTIVE:    Heidi Morales (: 1980) is a 46 y.o. female who presents for evaluation of left hip pain. Patient states symptoms have been present for the past 6 months. She denies any history of trauma. She states she has been dealing with a stress fracture in the left foot/tibia about a year ago. 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 The past 6 months she has been experiencing lateral hip pain. Patient denies back pain or radiation of symptoms into the thigh or lower leg. Denies groin discomfort. Denies pain in the buttock. Patient states that she is able to use elliptical for 40 minutes without increasing her discomfort. She states when she runs she gets increased pain. The patient states that she does not take pain medication on a routine basis and rates her current level pain as a 2/10. Patient denies lower extremity numbness, tingling, pain or weakness. The patient denies saddle paraesthesias/ anaesthesia. Pain Assessment  2022   Location of Pain Foot   Location Modifiers Left          ROS    The patient denies fevers, chills, chest pain, shortness of breath, nausea, vomiting. Positive for musculoskeletal issues as described in the HPI. Vitals: There were no vitals taken for this visit. There is no height or weight on file to calculate BMI. PHYSICAL EXAM:    The patient is alert and oriented x3 and in no acute distress. Patient ambulates with a normal gait without gait aids. Sensory testing in all major nerve distributions in the lower extremities is intact and symmetric.  Manual motor testing of the major muscle groups of the lower extremities including dorsiflexion/EHL/ plantarflexion, knee flexion/extension, hip flexion/extension/abduction/ adduction is intact and symmetric in the bilateral lower extremities. Deep tendon reflexes +2/4 and symmetric in the bilateral knees and ankles. Negative straight leg raise bilaterally. No evidence of clonus bilaterally. Babinski's downgoing and symmetric bilaterally. Distal pulses intact and symmetric bilaterally. There is no tenderness to palpation of the trochanteric bursa, the hip joint itself, thoracic, lumbar or sacral regions. Physical examination of the bilateral hips demonstrates hip flexion to 110+ degrees with 30 degrees internal rotation 40 degrees external rotation without discomfort. Negative Stinchfield, negative logroll, negative impingement. Distal motor and sensation intact. IMAGING:      XR Results (maximum last 3): Results from Appointment encounter on 06/24/22    XR SPINE LUMB MIN 4 V    Narrative  AP, lateral, flexion and extension view digital radiographs of the lumbar spine obtained in the office today were reviewed and demonstrate levoconvex scoliosis deformity of 30 degrees with the apex of curvature at the L2-L3 disc level. There is grade 1 retrolisthesis L2 on L3 of 7 mm which is mobile. Overall disc heights appear to be well-maintained. There is increased lumbar lordosis. There is no evidence of fracture, lytic lesion or acute bony abnormality. XR HIP LT W OR WO PELV 2-3 VWS    Narrative  AP pelvis and frog lateral digital radiograph of the left hip obtained in the office today was reviewed and demonstrate good preservation of femoral acetabular joint space. The patient is noted to have very anteverted hips bilaterally. with no evidence of degenerative changes or acute bony abnormality. Allergies   Allergen Reactions    Ceclor [Cefaclor] Hives    Tetanus Vaccines And Toxoid Hives         No current outpatient medications on file. No current facility-administered medications for this visit.          Past Medical History:   Diagnosis Date    Autoimmune disease ([facility] [address]     Cancer ([facility] [address] History reviewed. No pertinent surgical history. History reviewed. No pertinent family history. Social History     Tobacco Use    Smoking status: Never Smoker    Smokeless tobacco: Never Used   Vaping Use    Vaping Use: Never used   Substance Use Topics    Alcohol use: Yes     Alcohol/week: 0.8 standard drinks     Types: 1 Glasses of wine per week    Drug use: No                 ASSESSMENT/PLAN:      1. Hip pain, chronic, left  -     XR HIP LT W OR WO PELV 2-3 VWS; Future  -     XR SPINE LUMB MIN 4 V; Future  -     REFERRAL TO PHYSICAL THERAPY  2. Strain of left hip, initial encounter  -     REFERRAL TO PHYSICAL THERAPY  3. Other idiopathic scoliosis, lumbar region  -     REFERRAL TO PHYSICAL THERAPY  4. Bulge of lumbar disc without myelopathy  -     REFERRAL TO PHYSICAL THERAPY  5. Spondylolisthesis of lumbar region      Below is the assessment and plan developed based on review of pertinent history, physical exam, labs, studies, and medications. Have discussed the patients diagnosis and radiographic findings at length and have answered all patient questions to her questions to her satisfaction. Patient has scoliosis and I suspect that hip pain may be radiating from the back. If this were restrained I would suspect that she would have some tenderness to palpation, which she does not. Have advised use of OTC NSAIDs for pain relief and for the anti-inflammatory effects. .  Have provided the patient with a prescription for outpatient physical therapy for hip and core strengthening, modalities and instruction in a home exercise program.  Will plan on seeing the patient back for reevaluation on an as-needed basis. The patient understands and agrees to the treatment plan as outlined above. No follow-ups on file. Dr. Sang Pierre was available for immediate consult during this encounter. An electronic signature was used to authenticate this note.     -- Jenny Banegas PA-C

## 2022-07-21 ENCOUNTER — OFFICE VISIT (OUTPATIENT)
Dept: ORTHOPEDIC SURGERY | Age: 42
End: 2022-07-21
Payer: MEDICAID

## 2022-07-21 VITALS — WEIGHT: 130 LBS | HEIGHT: 67 IN | BODY MASS INDEX: 20.4 KG/M2

## 2022-07-21 DIAGNOSIS — M25.572 SINUS TARSI SYNDROME OF LEFT ANKLE: ICD-10-CM

## 2022-07-21 DIAGNOSIS — M77.52 LEFT ANKLE TENDINITIS: Primary | ICD-10-CM

## 2022-07-21 PROCEDURE — 99212 OFFICE O/P EST SF 10 MIN: CPT | Performed by: ORTHOPAEDIC SURGERY

## 2022-07-21 RX ORDER — DAPSONE 50 MG/G
GEL TOPICAL
COMMUNITY

## 2022-07-21 RX ORDER — TRIAMCINOLONE ACETONIDE 5 MG/G
OINTMENT TOPICAL
COMMUNITY

## 2022-07-21 NOTE — PROGRESS NOTES
Ellyn Richardson (: 1980) is a 46 y.o. female, patient,here for evaluation of the following   Chief Complaint   Patient presents with    Foot Pain        ASSESSMENT/PLAN:  Below is the assessment and plan developed based on review of pertinent history, physical exam, labs, studies, and medications. 1. Left ankle tendinitis  -     REFERRAL TO PHYSICAL THERAPY  2. Sinus tarsi syndrome of left ankle  -     REFERRAL TO PHYSICAL THERAPY  Patient informed of findings on MRI and reviewed the findings with her and have answered all her questions regarding the MRI, current symptoms and the treatment options. Fortunately surgery is not indicated and a lot of the pain appears to be associated to tendinopathy. She does have symptoms into the sinus tarsi and feels she still has sinus tarsi syndrome despite MRI not showing that. I recommend physical therapy program so a referral is made today. Other recommendations include continued stretching program as previously demonstrated, use of a semirigid arch support may also be helpful. Return if symptoms worsen or fail to improve. Allergies   Allergen Reactions    Ceclor [Cefaclor] Hives    Tetanus Vaccines And Toxoid Hives       Current Outpatient Medications   Medication Sig    Dapsone 5 % gel     triamcinolone acetonide (KENALOG) 0.5 % ointment      No current facility-administered medications for this visit. Past Medical History:   Diagnosis Date    Autoimmune disease ([facility] [address]     Cancer ([facility] [address]        No past surgical history on file. No family history on file.     Social History     Socioeconomic History    Marital status:      Spouse name: Not on file    Number of children: Not on file    Years of education: Not on file    Highest education level: Not on file   Occupational History    Not on file   Tobacco Use    Smoking status: Never    Smokeless tobacco: Never   Vaping Use    Vaping Use: Never used   Substance and Sexual Activity    Alcohol use: Yes     Alcohol/week: 0.8 standard drinks     Types: 1 Glasses of wine per week    Drug use: No    Sexual activity: Yes     Partners: Male     Birth control/protection: Condom, Pill   Other Topics Concern    Not on file   Social History Narrative    Not on file     Social Determinants of Health     Financial Resource Strain: Not on file   Food Insecurity: Not on file   Transportation Needs: Not on file   Physical Activity: Not on file   Stress: Not on file   Social Connections: Not on file   Intimate Partner Violence: Not on file   Housing Stability: Not on file           Vitals:  Ht 5' 7\" (1.702 m)   Wt 130 lb (59 kg)   BMI 20.36 kg/m²    Body mass index is 20.36 kg/m². SUBJECTIVE/OBJECTIVE:  Dara Leung (: 1980)   Patient is back today to review the MRI results obtained in the past, she still having the same symptoms not improve although seems to be different areas of pain. Previously most of her symptoms were at the left sinus tarsi only therefore the MRI ordered was of the foot, now complaining of other areas of pain along the ankle in line with multiple tendons. No new injuries. Physical Exam  Pleasant well-nourished female , alert and oriented to person, time and place, no acute distress. Normal gait, normal weightbearing stance. Left lower extremity/ankle: Now some tenderness along the Achilles tendon and peroneal and posterior tibial tendon, there is no swelling, this exam is different from previous exam where she had no tenderness at the ankle or lower extremity tendons, calves are soft nontender. There is a positive Silfverskiold test.    Left foot: There is no swelling, ecchymosis, fluctuance or erythema. Tenderness lateral sinus tarsi, rest of foot nontender. Able to flex and extend all toes with good range of motion and strength. Contralateral foot and ankle exam, nontender, no swelling ligaments grossly stable. Normal weightbearing stance.     Neurovascular exam intact for light touch sensation, cap refill, dorsalis pedis pulse palpable, flexion/extension strength 5/5. Skin intact without open wounds, lesions or ulcers, no skin discolorations, normal warmth to skin. Imaging:    No x-rays obtained today but reviewed the MRI without contrast left foot dated February 7, 2022, it shows tendinitis of the extensor tendons, tenosynovitis of the peroneal tendons. Stress reaction or other areas of edema. These views were reviewed on PACS, confirmed with radiology report, detailed radiology report is in chart. Summary of radiology report is below. IMPRESSION     1. Extensive tenosynovitis of the extensor digitorum tendon sheath  2. Mild tenosynovitis of the peroneal tendons  3. Fat within the sinus tarsi is preserved       An electronic signature was used to authenticate this note.   -- Elisa Hansen MD

## 2022-07-21 NOTE — LETTER
7/26/2022    Patient: Amelie Figueredo   YOB: 1980   Date of Visit: 7/21/2022     Amie Moyer, [address] [address] [address]  [address] [facility] [address]    Dear Amie Moyer MD,      Thank you for referring Ms. Macarena Menon to [facility] for evaluation. My notes for this consultation are attached. If you have questions, please do not hesitate to call me. I look forward to following your patient along with you.       Sincerely,    Colby Flores MD

## 2023-05-23 RX ORDER — TRIAMCINOLONE ACETONIDE 5 MG/G
OINTMENT TOPICAL
COMMUNITY

## 2023-05-23 RX ORDER — DAPSONE 50 MG/G
GEL TOPICAL
COMMUNITY